# Patient Record
Sex: FEMALE | Race: WHITE | URBAN - METROPOLITAN AREA
[De-identification: names, ages, dates, MRNs, and addresses within clinical notes are randomized per-mention and may not be internally consistent; named-entity substitution may affect disease eponyms.]

---

## 2022-10-27 ENCOUNTER — OFFICE VISIT (OUTPATIENT)
Dept: OBGYN CLINIC | Facility: CLINIC | Age: 52
End: 2022-10-27

## 2022-10-27 VITALS
HEIGHT: 66 IN | SYSTOLIC BLOOD PRESSURE: 114 MMHG | BODY MASS INDEX: 21.8 KG/M2 | WEIGHT: 135.63 LBS | DIASTOLIC BLOOD PRESSURE: 76 MMHG

## 2022-10-27 DIAGNOSIS — Z01.419 ENCOUNTER FOR WELL WOMAN EXAM WITH ROUTINE GYNECOLOGICAL EXAM: Primary | ICD-10-CM

## 2022-10-27 DIAGNOSIS — Z12.31 BREAST CANCER SCREENING BY MAMMOGRAM: ICD-10-CM

## 2022-10-27 PROCEDURE — 99386 PREV VISIT NEW AGE 40-64: CPT | Performed by: OBSTETRICS & GYNECOLOGY

## 2022-10-27 NOTE — PROGRESS NOTES
Nelson Juarez is a 54year old female  No LMP recorded (lmp unknown). Patient presents with:  Gyn Problem: Would like to discuss menopausal symptoms  . Patient had supracervical hysterectomy about 10 years ago, started having hot flashes about 2 years ago, OTC meds helped, then she stopped taking it and was OK for 6 months until all symptoms came back with vaginal dryness in addition. Patient had pap smear within this year - normal  OBSTETRICS HISTORY:  OB History    Para Term  AB Living   4 3 3   0 3   SAB IAB Ectopic Multiple Live Births   0       3      # Outcome Date GA Lbr Matthew/2nd Weight Sex Delivery Anes PTL Lv   4 Term 03/10/03 39w0d   M Caesarean   LIN   3 Term 94 39w0d   F NORMAL SPONT   LIN   2 Term 10/20/91 39w0d   F NORMAL SPONT   LIN   1                 GYNE HISTORY:  Periods none due to hysterectomy      Sexual activity: Yes   Partners: Male        Hx Prior Abnormal Pap: No  Pap Date: 10/28/21 (Done in [place] normal)        MEDICAL HISTORY:  History reviewed. No pertinent past medical history. SURGICAL HISTORY:  History reviewed. No pertinent surgical history.     SOCIAL HISTORY:  Social History    Socioeconomic History      Marital status:       Spouse name: Not on file      Number of children: Not on file      Years of education: Not on file      Highest education level: Not on file    Occupational History      Not on file    Tobacco Use      Smoking status: Never      Smokeless tobacco: Never    Vaping Use      Vaping Use: Never used    Substance and Sexual Activity      Alcohol use: Never      Drug use: Never      Sexual activity: Yes        Partners: Male    Other Topics      Concerns:        Not on file    Social History Narrative      Not on file    Social Determinants of Health  Financial Resource Strain: Not on file  Food Insecurity: Not on file  Transportation Needs: Not on file  Physical Activity: Not on file  Stress: Not on file  Social Connections: Not on file  Intimate Partner Violence: Not on file  Housing Stability: Not on file    FAMILY HISTORY:  No family history on file. MEDICATIONS:  No current outpatient medications on file. ALLERGIES:  No Known Allergies      Review of Systems:  Constitutional:  Denies fatigue, night sweats, hot flashes  Eyes:  denies blurred or double vision  Cardiovascular:  denies chest pain or palpitations  Respiratory:  denies shortness of breath  Gastrointestinal:  denies heartburn, abdominal pain, diarrhea or constipation  Genitourinary:  denies dysuria, incontinence, abnormal vaginal discharge, vaginal itching  Musculoskeletal:  denies back pain. Skin/Breast:  Denies any breast pain, lumps, or discharge. Neurological:  denies headaches, extremity weakness or numbness. Psychiatric: denies depression or anxiety. Endocrine:   denies excessive thirst or urination. Heme/Lymph:  denies history of anemia, easy bruising or bleeding. PHYSICAL EXAM:   Constitutional: well developed, well nourished  Head/Face: normocephalic  Neck/Thyroid: thyroid symmetric, no thyromegaly, no nodules, no adenopathy  Lymphatic:no abnormal supraclavicular or axillary adenopathy is noted  Breast: normal without palpable masses, tenderness, asymmetry, nipple discharge, nipple retraction or skin changes  Abdomen:  soft, nontender, nondistended, no masses  Skin/Hair: no unusual rashes or bruises  Extremities: no edema, no cyanosis  Psychiatric:  Oriented to time, place, person and situation.  Appropriate mood and affect    Pelvic Exam:  External Genitalia: normal appearance, hair distribution, and no lesions  Urethral Meatus:  normal in size, location, without lesions and prolapse  Bladder:  No fullness, masses or tenderness  Vagina:  Normal appearance without lesions, no abnormal discharge  Cervix:  Normal without tenderness on motion  Uterus: absent  Adnexa: normal without masses or tenderness  Perineum: normal  Anus: no hemorroids       Discussed menopause and symptoms, patient restarted OTC medication 2 weeks ago and would like to see if helps, discussed HRT, revaree info given    Assessment & Plan:  Diagnoses and all orders for this visit:    Encounter for well woman exam with routine gynecological exam    Breast cancer screening by mammogram  -     [facility] ROBERTO 2D+3D SCREENING BILAT (CPT=77067/13144);  Future          - revaree Topical Clindamycin Pregnancy And Lactation Text: This medication is Pregnancy Category B and is considered safe during pregnancy. It is unknown if it is excreted in breast milk.

## 2023-02-09 ENCOUNTER — TELEPHONE (OUTPATIENT)
Facility: CLINIC | Age: 53
End: 2023-02-09

## 2023-02-09 DIAGNOSIS — R53.83 OTHER FATIGUE: Primary | ICD-10-CM

## 2023-02-09 NOTE — TELEPHONE ENCOUNTER
Patient's daughter Benita Patel, calling on her moms behalf.  She would like to have some blood work done  WEI Prabhakar  And T3-T4

## 2023-02-09 NOTE — TELEPHONE ENCOUNTER
Spoke with pt's daughter, Shira Balbuena, per HIPAA form. Pt is feeling more fatigued over the past couple weeks. She noticed some discomfort & slight swelling on the left side of her neck. She would like to have some thyroid tests done to rule out any problems. She had thyroid screening done prior to coming to the [address] [place] [address],[address]. I let her know I would get a message to Dr. Wei Valadez and call with recommendations. Orders pended. Verbalized understanding.

## 2023-02-10 NOTE — TELEPHONE ENCOUNTER
Relayed Dr. Christofer Bishop ms. Its a new issue   2. Advise patient to follow up with PCP     Verbalized understanding, agreed to and intend to comply with plan of care.